# Patient Record
Sex: MALE | Race: BLACK OR AFRICAN AMERICAN | Employment: OTHER | URBAN - METROPOLITAN AREA
[De-identification: names, ages, dates, MRNs, and addresses within clinical notes are randomized per-mention and may not be internally consistent; named-entity substitution may affect disease eponyms.]

---

## 2017-03-30 PROBLEM — I10 HYPERTENSION: Status: ACTIVE | Noted: 2017-03-30

## 2017-04-19 ENCOUNTER — HOSPITAL ENCOUNTER (OUTPATIENT)
Dept: RADIATION THERAPY | Age: 72
Discharge: HOME | End: 2017-04-19
Payer: COMMERCIAL

## 2017-04-19 DIAGNOSIS — C61 MALIGNANT NEOPLASM OF PROSTATE (HCC): ICD-10-CM

## 2017-04-19 PROCEDURE — 99201 HC NEW PT LEVEL I: CPT

## 2017-04-20 ENCOUNTER — HOSPITAL ENCOUNTER (OUTPATIENT)
Dept: LAB | Age: 72
Discharge: HOME | End: 2017-04-20

## 2017-04-20 ENCOUNTER — HOSPITAL ENCOUNTER (OUTPATIENT)
Dept: LAB | Age: 72
Discharge: HOME | End: 2017-04-20
Payer: COMMERCIAL

## 2017-04-20 DIAGNOSIS — C61 MALIGNANT NEOPLASM OF PROSTATE (HCC): ICD-10-CM

## 2017-04-20 LAB — SENTARA SPECIMEN COL,SENBCF: NORMAL

## 2017-04-20 PROCEDURE — 93005 ELECTROCARDIOGRAM TRACING: CPT

## 2017-04-20 PROCEDURE — 99001 SPECIMEN HANDLING PT-LAB: CPT | Performed by: RADIOLOGY

## 2017-04-21 LAB
ATRIAL RATE: 83 BPM
CALCULATED P AXIS, ECG09: 79 DEGREES
CALCULATED R AXIS, ECG10: 77 DEGREES
CALCULATED T AXIS, ECG11: 63 DEGREES
DIAGNOSIS, 93000: NORMAL
P-R INTERVAL, ECG05: 172 MS
Q-T INTERVAL, ECG07: 388 MS
QRS DURATION, ECG06: 94 MS
QTC CALCULATION (BEZET), ECG08: 455 MS
VENTRICULAR RATE, ECG03: 83 BPM

## 2017-04-28 ENCOUNTER — HOSPITAL ENCOUNTER (OUTPATIENT)
Dept: RADIATION THERAPY | Age: 72
Discharge: HOME | End: 2017-04-28
Payer: COMMERCIAL

## 2017-04-28 PROCEDURE — 76873 ECHOGRAP TRANS R PROS STUDY: CPT

## 2017-05-03 DIAGNOSIS — C61 MALIGNANT NEOPLASM OF PROSTATE (HCC): ICD-10-CM

## 2017-05-10 ENCOUNTER — ANESTHESIA EVENT (OUTPATIENT)
Dept: RADIATION THERAPY | Age: 72
End: 2017-05-10
Payer: COMMERCIAL

## 2017-05-10 ENCOUNTER — HOSPITAL ENCOUNTER (OUTPATIENT)
Dept: LAB | Age: 72
Discharge: HOME | End: 2017-05-10

## 2017-05-10 LAB — SENTARA SPECIMEN COL,SENBCF: NORMAL

## 2017-05-10 PROCEDURE — 99001 SPECIMEN HANDLING PT-LAB: CPT | Performed by: RADIOLOGY

## 2017-05-11 ENCOUNTER — HOSPITAL ENCOUNTER (OUTPATIENT)
Dept: RADIATION THERAPY | Age: 72
Discharge: HOME | End: 2017-05-11
Admitting: UROLOGY
Payer: COMMERCIAL

## 2017-05-11 ENCOUNTER — ANESTHESIA (OUTPATIENT)
Dept: RADIATION THERAPY | Age: 72
End: 2017-05-11
Payer: COMMERCIAL

## 2017-05-11 VITALS
TEMPERATURE: 97.7 F | RESPIRATION RATE: 20 BRPM | HEART RATE: 67 BPM | BODY MASS INDEX: 28.45 KG/M2 | WEIGHT: 177 LBS | DIASTOLIC BLOOD PRESSURE: 80 MMHG | HEIGHT: 66 IN | SYSTOLIC BLOOD PRESSURE: 163 MMHG | OXYGEN SATURATION: 96 %

## 2017-05-11 PROBLEM — C61 MALIGNANT NEOPLASM OF PROSTATE (HCC): Status: ACTIVE | Noted: 2017-05-11

## 2017-05-11 PROCEDURE — 82947 ASSAY GLUCOSE BLOOD QUANT: CPT | Performed by: UROLOGY

## 2017-05-11 PROCEDURE — 74011250636 HC RX REV CODE- 250/636: Performed by: NURSE ANESTHETIST, CERTIFIED REGISTERED

## 2017-05-11 PROCEDURE — 77030012510 HC MSK AIRWY LMA TELE -B: Performed by: RADIOLOGY

## 2017-05-11 PROCEDURE — 74011000250 HC RX REV CODE- 250

## 2017-05-11 PROCEDURE — 77778 APPLY INTERSTIT RADIAT COMPL: CPT

## 2017-05-11 PROCEDURE — 74011250636 HC RX REV CODE- 250/636

## 2017-05-11 PROCEDURE — 77030036874 HC SPCR RECTAL HYDRGEL SPACEOAR AGMX -I

## 2017-05-11 PROCEDURE — 76060000034 HC ANESTHESIA 1.5 TO 2 HR

## 2017-05-11 PROCEDURE — 77295 3-D RADIOTHERAPY PLAN: CPT

## 2017-05-11 PROCEDURE — A4648 IMPLANTABLE TISSUE MARKER: HCPCS

## 2017-05-11 PROCEDURE — 77332 RADIATION TREATMENT AID(S): CPT

## 2017-05-11 PROCEDURE — 77030032490 HC SLV COMPR SCD KNE COVD -B

## 2017-05-11 PROCEDURE — 77030013079 HC BLNKT BAIR HGGR 3M -A: Performed by: RADIOLOGY

## 2017-05-11 PROCEDURE — 74011250637 HC RX REV CODE- 250/637: Performed by: NURSE ANESTHETIST, CERTIFIED REGISTERED

## 2017-05-11 PROCEDURE — 77030018846 HC SOL IRR STRL H20 ICUM -A

## 2017-05-11 PROCEDURE — 74011636320 HC RX REV CODE- 636/320: Performed by: RADIOLOGY

## 2017-05-11 PROCEDURE — 74011250636 HC RX REV CODE- 250/636: Performed by: RADIOLOGY

## 2017-05-11 PROCEDURE — 73290000070 HC RAD ONC TIME 1.5 TO 2 HR

## 2017-05-11 PROCEDURE — 77030034850

## 2017-05-11 PROCEDURE — C2636 BRACHY LINEAR, NON-STR,P-103: HCPCS

## 2017-05-11 PROCEDURE — 77030015736 HC BLLN ENDOCAV CIVC -B

## 2017-05-11 RX ORDER — OXYCODONE AND ACETAMINOPHEN 5; 325 MG/1; MG/1
1 TABLET ORAL
Qty: 12 TAB | Refills: 0 | Status: SHIPPED | OUTPATIENT
Start: 2017-05-11 | End: 2018-04-19

## 2017-05-11 RX ORDER — MIDAZOLAM HYDROCHLORIDE 1 MG/ML
INJECTION, SOLUTION INTRAMUSCULAR; INTRAVENOUS AS NEEDED
Status: DISCONTINUED | OUTPATIENT
Start: 2017-05-11 | End: 2017-05-11 | Stop reason: HOSPADM

## 2017-05-11 RX ORDER — SODIUM CHLORIDE, SODIUM LACTATE, POTASSIUM CHLORIDE, CALCIUM CHLORIDE 600; 310; 30; 20 MG/100ML; MG/100ML; MG/100ML; MG/100ML
50 INJECTION, SOLUTION INTRAVENOUS CONTINUOUS
Status: CANCELLED | OUTPATIENT
Start: 2017-05-11

## 2017-05-11 RX ORDER — HYDROMORPHONE HYDROCHLORIDE 2 MG/ML
0.5 INJECTION, SOLUTION INTRAMUSCULAR; INTRAVENOUS; SUBCUTANEOUS
Status: CANCELLED | OUTPATIENT
Start: 2017-05-11

## 2017-05-11 RX ORDER — PROPOFOL 10 MG/ML
INJECTION, EMULSION INTRAVENOUS AS NEEDED
Status: DISCONTINUED | OUTPATIENT
Start: 2017-05-11 | End: 2017-05-11 | Stop reason: HOSPADM

## 2017-05-11 RX ORDER — FAMOTIDINE 20 MG/1
20 TABLET, FILM COATED ORAL ONCE
Status: COMPLETED | OUTPATIENT
Start: 2017-05-11 | End: 2017-05-11

## 2017-05-11 RX ORDER — ONDANSETRON 2 MG/ML
4 INJECTION INTRAMUSCULAR; INTRAVENOUS
Status: CANCELLED | OUTPATIENT
Start: 2017-05-11

## 2017-05-11 RX ORDER — OXYCODONE AND ACETAMINOPHEN 5; 325 MG/1; MG/1
1 TABLET ORAL
Qty: 12 TAB | Refills: 0 | OUTPATIENT
Start: 2017-05-11 | End: 2017-05-11

## 2017-05-11 RX ORDER — HYDROCODONE BITARTRATE AND ACETAMINOPHEN 5; 325 MG/1; MG/1
1 TABLET ORAL AS NEEDED
Status: CANCELLED | OUTPATIENT
Start: 2017-05-11

## 2017-05-11 RX ORDER — DOCUSATE SODIUM 100 MG/1
100 CAPSULE, LIQUID FILLED ORAL 2 TIMES DAILY
Qty: 60 CAP | Refills: 0 | OUTPATIENT
Start: 2017-05-11 | End: 2017-05-11

## 2017-05-11 RX ORDER — CEFAZOLIN SODIUM 2 G/50ML
2 SOLUTION INTRAVENOUS ONCE
Status: COMPLETED | OUTPATIENT
Start: 2017-05-11 | End: 2017-05-11

## 2017-05-11 RX ORDER — DOCUSATE SODIUM 100 MG/1
100 CAPSULE, LIQUID FILLED ORAL 2 TIMES DAILY
Qty: 60 CAP | Refills: 0 | Status: SHIPPED | OUTPATIENT
Start: 2017-05-11 | End: 2017-06-10

## 2017-05-11 RX ORDER — SODIUM CHLORIDE, SODIUM LACTATE, POTASSIUM CHLORIDE, CALCIUM CHLORIDE 600; 310; 30; 20 MG/100ML; MG/100ML; MG/100ML; MG/100ML
75 INJECTION, SOLUTION INTRAVENOUS CONTINUOUS
Status: DISPENSED | OUTPATIENT
Start: 2017-05-12 | End: 2017-05-12

## 2017-05-11 RX ORDER — FENTANYL CITRATE 50 UG/ML
25 INJECTION, SOLUTION INTRAMUSCULAR; INTRAVENOUS AS NEEDED
Status: CANCELLED | OUTPATIENT
Start: 2017-05-11

## 2017-05-11 RX ORDER — SODIUM CHLORIDE 0.9 % (FLUSH) 0.9 %
5-10 SYRINGE (ML) INJECTION ONCE
Status: COMPLETED | OUTPATIENT
Start: 2017-05-11 | End: 2017-05-11

## 2017-05-11 RX ORDER — LIDOCAINE HYDROCHLORIDE 20 MG/ML
INJECTION, SOLUTION EPIDURAL; INFILTRATION; INTRACAUDAL; PERINEURAL AS NEEDED
Status: DISCONTINUED | OUTPATIENT
Start: 2017-05-11 | End: 2017-05-11 | Stop reason: HOSPADM

## 2017-05-11 RX ORDER — ONDANSETRON 2 MG/ML
INJECTION INTRAMUSCULAR; INTRAVENOUS AS NEEDED
Status: DISCONTINUED | OUTPATIENT
Start: 2017-05-11 | End: 2017-05-11 | Stop reason: HOSPADM

## 2017-05-11 RX ADMIN — ONDANSETRON 4 MG: 2 INJECTION INTRAMUSCULAR; INTRAVENOUS at 10:33

## 2017-05-11 RX ADMIN — Medication 10 ML: at 09:30

## 2017-05-11 RX ADMIN — CEFAZOLIN SODIUM 2 G: 2 SOLUTION INTRAVENOUS at 10:19

## 2017-05-11 RX ADMIN — PROPOFOL 150 MG: 10 INJECTION, EMULSION INTRAVENOUS at 10:23

## 2017-05-11 RX ADMIN — MIDAZOLAM HYDROCHLORIDE 2 MG: 1 INJECTION, SOLUTION INTRAMUSCULAR; INTRAVENOUS at 10:19

## 2017-05-11 RX ADMIN — LIDOCAINE HYDROCHLORIDE 100 MG: 20 INJECTION, SOLUTION EPIDURAL; INFILTRATION; INTRACAUDAL; PERINEURAL at 10:23

## 2017-05-11 RX ADMIN — SODIUM CHLORIDE, SODIUM LACTATE, POTASSIUM CHLORIDE, AND CALCIUM CHLORIDE: 600; 310; 30; 20 INJECTION, SOLUTION INTRAVENOUS at 10:19

## 2017-05-11 RX ADMIN — FAMOTIDINE 20 MG: 20 TABLET, FILM COATED ORAL at 09:33

## 2017-05-11 RX ADMIN — IOVERSOL 0.5 ML: 678 INJECTION INTRA-ARTERIAL; INTRAVENOUS at 09:34

## 2017-05-11 NOTE — PROGRESS NOTES
PROCEDURE NOTE    Pt arrived to procedure room at 1019, pt placed supine, monitors placed, bonilla inserted with 0.5ml Optiray & 9.5ml NS in balloon, Drain & then clamped w/ 150ml Sterile water instilled in bladder.      Body aligned SCDs placed OLEKSANDR LE, Pt arms on armboard with eggcrate for pressure support, head remains aligned Right & Left Leg placed in Yellow Fin Stirrups, eggcrates & gelpads for pressure points    Time-out performed: 1042    Procedure Start: Michelle [place] 94 Implanted: 1115    Final Seed Implanted: 1130    Procedure stop: 1150    Exit room:

## 2017-05-11 NOTE — ANESTHESIA POSTPROCEDURE EVALUATION
Post-Anesthesia Evaluation & Assessment    Visit Vitals    BP (P) 165/83 (BP 1 Location: Left arm, BP Patient Position: Head of bed elevated (Comment degrees))    Pulse 80    Temp 36.6 °C (97.9 °F)    Resp 15    Ht 5' 6\" (1.676 m)    Wt 80.3 kg (177 lb)    SpO2 96%    BMI 28.57 kg/m2       Nausea/Vomiting: no nausea    Post-operative hydration adequate.     Pain score (VAS): 0    Mental status & Level of consciousness: alert and oriented x 3    Neurological status: moves all extremities, sensation grossly intact    Pulmonary status: airway patent, no supplemental oxygen required    Complications related to anesthesia: none    Additional comments:

## 2017-05-11 NOTE — LETTER
5/17/2017 2:58 PM 
 
Mr. Ericka Vaughn [address] [facility] [address] [id number] Dear Ericka Vaughn: 
 
Please find your most recent results below. Resulted Orders POC 6 PLUS Result Value Ref Range Sodium (POC) 142 136 - 145 MMOL/L Potassium (POC) 3.1 (L) 3.5 - 5.5 MMOL/L Chloride (POC) 101 100 - 108 MMOL/L  
 BUN (POC) 11 7 - 18 MG/DL Glucose (POC) 119 (H) 74 - 106 MG/DL Hematocrit (POC) 40 36 - 49 % Hemoglobin (POC) 13.6 12 - 16 G/DL  
 
 
RECOMMENDATIONS: 
Noted Please call me if you have any questions: [phone] Sincerely, 
 
 
Bam Blackwell MD/cayden

## 2017-05-11 NOTE — IP AVS SNAPSHOT
Poe Screen 
 
 
 [address] [address] 
[phone] Patient: Charissa Hammans MRN: [id number] RTV:[id number] You are allergic to the following No active allergies Recent Documentation Height Weight BMI Smoking Status 1.676 m 80.3 kg 28.57 kg/m2 Current Every Day Smoker Emergency Contacts  (Rel.) Home Phone Work Phone Mobile Phone Charlene Grayson (Spouse) [phone] -- -- About your hospitalization You were admitted on:  May 11, 2017 You last received care in the:  [facility] RADIATION THERAPY You were discharged on:  May 11, 2017 Why you were hospitalized Your primary diagnosis was:  Not on File Your Primary Care Physician (PCP) Primary Care Physician Office Phone Office Fax Jordy Killian [phone] Follow-up Information Follow up With Details Comments Contact Info Elinor Kurtz MD   [address] [address] 
[phone] Appointments for Next 14 days 5/25/2017  2:30 PM  NURSE VISIT [facility] Current Discharge Medication List  
  
START taking these medications Dose & Instructions Dispensing Information Comments Morning Noon Evening Bedtime  
 docusate sodium 100 mg capsule Commonly known as:  Prince Maria Isabel Your last dose was: Your next dose is:    
   
   
 Dose:  100 mg Take 1 Cap by mouth two (2) times a day for 30 days. Quantity:  60 Cap Refills:  0  
     
   
   
   
  
 oxyCODONE-acetaminophen 5-325 mg per tablet Commonly known as:  PERCOCET Your last dose was: Your next dose is:    
   
   
 Dose:  1 Tab Take 1 Tab by mouth every four (4) hours as needed for Pain. Max Daily Amount: 6 Tabs. Quantity:  12 Tab Refills:  0 CONTINUE these medications which have NOT CHANGED Dose & Instructions Dispensing Information Comments Morning Noon Evening Bedtime  
 amLODIPine 5 mg tablet Commonly known as:  Sherald Burkitt Your last dose was: Your next dose is:    
   
   
  Refills:  0  
     
   
   
   
  
 aspirin 81 mg chewable tablet Your last dose was: Your next dose is:    
   
   
 Dose:  81 mg Take 81 mg by mouth daily. Refills:  0  
     
   
   
   
  
 diclofenac 0.1 % ophthalmic solution Commonly known as:  VOLTAREN Your last dose was: Your next dose is:    
   
   
  Refills:  0  
     
   
   
   
  
 dorzolamide-timolol 22.3-6.8 mg/mL ophthalmic solution Commonly known as:  COSOPT Your last dose was: Your next dose is:    
   
   
  Refills:  1  
     
   
   
   
  
 hydroCHLOROthiazide 25 mg tablet Commonly known as:  HYDRODIURIL Your last dose was: Your next dose is:    
   
   
  Refills:  0  
     
   
   
   
  
 potassium chloride SA 10 mEq capsule Commonly known as:  Komal Zapata Your last dose was: Your next dose is:    
   
   
  Refills:  0  
     
   
   
   
  
 pravastatin 20 mg tablet Commonly known as:  PRAVACHOL Your last dose was: Your next dose is:    
   
   
  Refills:  0  
     
   
   
   
  
 TRAVATAN Z 0.004 % ophthalmic solution Generic drug:  travoprost  
   
Your last dose was: Your next dose is:    
   
   
  Refills:  0 Where to Get Your Medications Information on where to get these meds will be given to you by the nurse or doctor. ! Ask your nurse or doctor about these medications  
  docusate sodium 100 mg capsule  
 oxyCODONE-acetaminophen 5-325 mg per tablet Discharge Instructions Post-Operative Seed Implant Instructions Many radioactive metallic seeds were implanted into your prostate. The seeds are similar to a grain of rice in size and shape.  Though the seeds are designed to remain in place, they may be expelled through the urine. This should not alarm you; however, it is important that you do not  the radioactive seed with your fingers. Please use a spoon or a pair of tweezers to place the radioactive seed in the toilet and then flush During the first few days you may have some bruising on the perineum (the place between your anus and your scrotum) or on the scrotum itself. This is caused by the needles (usually 20-30) which are used to place the seeds. This will resolve on its own and usually does not cause any discomfort. For about a week after treatment, you may have some pain or swelling in the area between your scrotum and rectum, and your urine may be reddish-brown. Rarely a larger hematoma may form on the perineum and this will cause some discomfort with sitting. This should be brought to the doctors attention, but it will resolve on its own. You can alternate between an ice pack and heat pack for 10 minutes interval to assist with the reduction of inflammation and pain to perineum. Side Effects Immediately post procedure: blood in the urine, bruising/tenderness/discoloration at the implant site, and/or swelling at the implant site. These symptoms should subside after a few days. Some patients will have trouble passing urine after the catheter is removed due to swelling in the prostate. You should call Dr. Jeyson Salazar or go to Emergency Room if you cannot pass urine within 6 hours of catheter removal or any time if you are having trouble passing your urine. Some patients may require a catheter for 1 week or more after seed implantation. Other: The following side effects are most likely to occur over the first two months following the procedure: Frequency and/or urgency with urination, burning with urination, a weaker urine stream, as well as frequency and /or urgency of bowel movements.  After the initial two months, you should notice a steady decline in these symptoms. Your symptoms should subside completely by the end of six month to a year. Precautions ? Abstain from sexual activity for two weeks ? After the initial two weeks post procedure, you will be required to use a condom for the next 60 days anytime you have intercourse ? Do not let children under  eight years of age sit on your lap for the first sixty days ? Do not let pregnant women sit on your lap for the first sixty days. Diet:   
Regular, unless you are on a special diet for other reasons. Activity: 
  
Avoid heavy lifting or strenuous physical activity for the first two days after the procedure. At that time you may return to your normal activity level if the urine is clear and you feel fine. If the urine is still bloody you should rest and drink plenty of fluids until it is clear, and then you may resume normal activity. Avoid heavy lifting for one month. Avoid sitting on a hard seat (such as a bicycle) for 2 months. Avoid long periods of sitting, such as in a car or on an airplane without taking leg stretching 
breaks. Depending on the demands of your job, you may return to work any time during the week after the procedure, noting the precaution about prolonged sitting. You may return to a regular diet as tolerated following the procedure. Do not drink excessive amounts of fluids, as they can make side effects worse. You will experience a decrease in ejaculate following the procedures. This is normal, as the prostate gland is responsible for generating over 80% of the fluid disseminated during 
ejaculation. You will most likely experience a reddish color in your ejaculate for a few weeks following the procedure. This is normal and will improve as time 
passes, if it persists, see your doctor. Follow up Your follow up imaging will be at [facility] on ___________at ___________ am/pm. 
 
Please call us if you have any questions: [phone] Radiation Therapy DISCHARGE SUMMARY from Nurse The following personal items are in your possession at time of discharge: 
 
  
Visual Aid: Glasses PATIENT INSTRUCTIONS: 
 
 
F-face looks uneven A-arms unable to move or move unevenly S-speech slurred or non-existent T-time-call 911 as soon as signs and symptoms begin-DO NOT go Back to bed or wait to see if you get better-TIME IS BRAIN. Warning Signs of HEART ATTACK Call 911 if you have these symptoms: 
? Chest discomfort. Most heart attacks involve discomfort in the center of the chest that lasts more than a few minutes, or that goes away and comes back. It can feel like uncomfortable pressure, squeezing, fullness, or pain. ? Discomfort in other areas of the upper body. Symptoms can include pain or discomfort in one or both arms, the back, neck, jaw, or stomach. ? Shortness of breath with or without chest discomfort. ? Other signs may include breaking out in a cold sweat, nausea, or lightheadedness. Don't wait more than five minutes to call [address]! Fast action can save your life. Calling 911 is almost always the fastest way to get lifesaving treatment. Emergency Medical Services staff can begin treatment when they arrive  up to an hour sooner than if someone gets to the hospital by car. The discharge information has been reviewed with the patient and grand daughter. The patient and grand daughter verbalized understanding.  
 
Discharge medications reviewed with the patient and grand daughter and appropriate educational materials and side effects teaching were provided. Discharge Instructions Attachments/References CIPROFLOXACIN (BY MOUTH) (ENGLISH) IBUPROFEN (BY MOUTH) (ENGLISH) URINARY RETENTION (ENGLISH) NARCOTIC-ANALGESIC/ACETAMINOPHEN (BY MOUTH) (ENGLISH) LAXATIVE, STOOL SOFTENERS (BY MOUTH) (ENGLISH) Discharge Orders None Introducing [facility]! Peg Hemp introduces ideasoft patient portal. Now you can access parts of your medical record, email your doctor's office, and request medication refills online. 1. In your internet browser, go to [email or web address] 2. Click on the First Time User? Click Here link in the Sign In box. You will see the New Member Sign Up page. 3. Enter your ideasoft Access Code exactly as it appears below. You will not need to use this code after youve completed the sign-up process. If you do not sign up before the expiration date, you must request a new code. · ideasoft Access Code: [email or web address] Expires: 5/25/2017 11:06 AM 
 
4. Enter the last four digits of your Social Security Number (xxxx) and Date of Birth (mm/dd/yyyy) as indicated and click Submit. You will be taken to the next sign-up page. 5. Create a ideasoft ID. This will be your ideasoft login ID and cannot be changed, so think of one that is secure and easy to remember. 6. Create a ideasoft password. You can change your password at any time. 7. Enter your Password Reset Question and Answer. This can be used at a later time if you forget your password. 8. Enter your e-mail address. You will receive e-mail notification when new information is available in [address] 9. Click Sign Up. You can now view and download portions of your medical record. 10. Click the Download Summary menu link to download a portable copy of your medical information.  
 
If you have questions, please visit the Frequently Asked Questions section of the [employer]. Remember, MyChart is NOT to be used for urgent needs. For medical emergencies, dial 911. Now available from your iPhone and Android! General Information Please provide this summary of care documentation to your next provider. Patient Signature:  ____________________________________________________________ Date:  ____________________________________________________________  
  
Krystyna Lightning Provider Signature:  ____________________________________________________________ Date:  ____________________________________________________________

## 2017-05-11 NOTE — DISCHARGE INSTRUCTIONS
Post-Operative Seed Implant Instructions    Many radioactive metallic seeds were implanted into your prostate. The seeds are similar to a grain of rice in size and shape. Though the seeds are designed to remain in place, they may be expelled through the urine. This should not alarm you; however, it is important that you do not  the radioactive seed with your fingers. Please use a spoon or a pair of tweezers to place the radioactive seed in the toilet and then flush    During the first few days you may have some bruising on the perineum (the place between your anus and your scrotum) or on the scrotum itself. This is caused by the needles (usually 20-30) which are used to place the seeds. This will resolve on its own and usually does not cause any discomfort. For about a week after treatment, you may have some pain or swelling in the area between your scrotum and rectum, and your urine may be reddish-brown. Rarely a larger hematoma may form on the perineum and this will cause some discomfort with sitting. This should be brought to the doctors attention, but it will resolve on its own. You can alternate between an ice pack and heat pack for 10 minutes interval to assist with the reduction of inflammation and pain to perineum. Side Effects    Immediately post procedure: blood in the urine, bruising/tenderness/discoloration at the implant site, and/or swelling at the implant site. These symptoms should subside after a few days. Some patients will have trouble passing urine after the catheter is removed due to swelling in the prostate. You should call Dr. Ana Coles or go to Emergency Room if you cannot pass urine within 6 hours of catheter removal or any time if you are having trouble passing your urine. Some patients may require a catheter for 1 week or more after seed implantation. Other:   The following side effects are most likely to occur over the first two months following the procedure: Frequency and/or urgency with urination, burning with urination, a weaker urine stream, as well as frequency and /or urgency of bowel movements. After the initial two months, you should notice a steady decline in these symptoms. Your symptoms should subside completely by the end of six month to a year. Precautions     Abstain from sexual activity for two weeks   After the initial two weeks post procedure, you will be required to use a condom for the next 60 days anytime you have intercourse   Do not let children under  eight years of age sit on your lap for the first sixty days   Do not let pregnant women sit on your lap for the first sixty days. Diet:    Regular, unless you are on a special diet for other reasons. Activity:     Avoid heavy lifting or strenuous physical activity for the first two days after the procedure. At that time you may return to your normal activity level if the urine is clear and you feel fine. If the urine is still bloody you should rest and drink plenty of fluids until it is clear, and then you may resume normal activity. Avoid heavy lifting for one month. Avoid sitting on a hard seat (such as a bicycle) for 2 months. Avoid long periods of sitting, such as in a car or on an airplane without taking leg stretching  breaks. Depending on the demands of your job, you may return to work any time during the week after the procedure, noting the precaution about prolonged sitting. You may return to a regular diet as tolerated following the procedure. Do not drink excessive amounts of fluids, as they can make side effects worse. You will experience a decrease in ejaculate following the procedures. This is normal, as the prostate gland is responsible for generating over 80% of the fluid disseminated during  ejaculation. You will most likely experience a reddish color in your ejaculate for a few weeks following the procedure.  This is normal and will improve as time  passes, if it persists, see your doctor. Follow up     Your follow up imaging will be at [facility] at [facility]  on ___________at ___________ am/pm.    Please call us if you have any questions: [phone]    Radiation Therapy       DISCHARGE SUMMARY from Nurse    The following personal items are in your possession at time of discharge:       Visual Aid: Glasses                      PATIENT INSTRUCTIONS:    After general anesthesia or intravenous sedation, for 24 hours or while taking prescription Narcotics:  · Limit your activities  · Do not drive and operate hazardous machinery  · Do not make important personal or business decisions  · Do  not drink alcoholic beverages  · If you have not urinated within 8 hours after discharge, please contact your surgeon on call. Report the following to your surgeon:  · Excessive pain, swelling, redness or odor of or around the surgical area  · Temperature over 100.5  · Nausea and vomiting lasting longer than 4 hours or if unable to take medications  · Any signs of decreased circulation or nerve impairment to extremity: change in color, persistent  numbness, tingling, coldness or increase pain  · Any questions      What to do at Home:  Recommended activity: Activity as tolerated and no driving for today,     If you experience any of the following symptoms please follow up with Emergency Department. *  Please give a list of your current medications to your Primary Care Provider. *  Please update this list whenever your medications are discontinued, doses are      changed, or new medications (including over-the-counter products) are added. *  Please carry medication information at all times in case of emergency situations.           These are general instructions for a healthy lifestyle:    No smoking/ No tobacco products/ Avoid exposure to second hand smoke    Surgeon General's Warning:  Quitting smoking now greatly reduces serious risk to your health. Obesity, smoking, and sedentary lifestyle greatly increases your risk for illness    A healthy diet, regular physical exercise & weight monitoring are important for maintaining a healthy lifestyle    You may be retaining fluid if you have a history of heart failure or if you experience any of the following symptoms:  Weight gain of 3 pounds or more overnight or 5 pounds in a week, increased swelling in our hands or feet or shortness of breath while lying flat in bed. Please call your doctor as soon as you notice any of these symptoms; do not wait until your next office visit. Recognize signs and symptoms of STROKE:    F-face looks uneven    A-arms unable to move or move unevenly    S-speech slurred or non-existent    T-time-call 911 as soon as signs and symptoms begin-DO NOT go       Back to bed or wait to see if you get better-TIME IS BRAIN. Warning Signs of HEART ATTACK     Call 911 if you have these symptoms:   Chest discomfort. Most heart attacks involve discomfort in the center of the chest that lasts more than a few minutes, or that goes away and comes back. It can feel like uncomfortable pressure, squeezing, fullness, or pain.  Discomfort in other areas of the upper body. Symptoms can include pain or discomfort in one or both arms, the back, neck, jaw, or stomach.  Shortness of breath with or without chest discomfort.  Other signs may include breaking out in a cold sweat, nausea, or lightheadedness. Don't wait more than five minutes to call 911 - MINUTES MATTER! Fast action can save your life. Calling 911 is almost always the fastest way to get lifesaving treatment. Emergency Medical Services staff can begin treatment when they arrive -- up to an hour sooner than if someone gets to the hospital by car. The discharge information has been reviewed with the patient and grand daughter. The patient and grand daughter verbalized understanding.     Discharge medications reviewed with the patient and grand daughter and appropriate educational materials and side effects teaching were provided.

## 2017-05-11 NOTE — PROGRESS NOTES
4517: Patient arrived for procedure. A&Ox4. Per patients report bowel prep complete. NPO since midnight. Reviewed Allergies and PTA medications. Consent verified and in chart. Denies pain or any other discomfort.

## 2017-05-11 NOTE — ANESTHESIA PREPROCEDURE EVALUATION
Anesthetic History   No history of anesthetic complications            Review of Systems / Medical History  Patient summary reviewed, nursing notes reviewed and pertinent labs reviewed    Pulmonary  Within defined limits                 Neuro/Psych   Within defined limits           Cardiovascular    Hypertension: well controlled              Exercise tolerance: >4 METS     GI/Hepatic/Renal           Liver disease     Endo/Other        Arthritis     Other Findings              Physical Exam    Airway  Mallampati: III  TM Distance: 4 - 6 cm  Neck ROM: normal range of motion   Mouth opening: Normal     Cardiovascular  Regular rate and rhythm,  S1 and S2 normal,  no murmur, click, rub, or gallop  Rhythm: regular  Rate: normal         Dental  No notable dental hx       Pulmonary  Breath sounds clear to auscultation               Abdominal  GI exam deferred       Other Findings            Anesthetic Plan    ASA: 3  Anesthesia type: general          Induction: Intravenous  Anesthetic plan and risks discussed with: Patient

## 2017-05-11 NOTE — PROGRESS NOTES
POST PROCEDURE NOTE    Pt arrived to post procedure area B at 1156, pt in supine with head of bed elevated, monitors placed, bonilla catheter discontinued in treatment room 100ml of dark anna colored urine. Patient voided 200ml of anna colored urine. Bladder scan performed with the patient having 81ml of residual urine in the bladder.      Patient discharged from procedure area B: 1305

## 2017-05-11 NOTE — BRIEF OP NOTE
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 Surgeon:  Vahid Patrick MD  Radiation oncologist:  Dr. Mitzi Mcdonough MD  Anesthesia: General  Estimated Blood Loss: less than 20 cc. Specimens: * No specimens in log *   Findings: Prostate volume of 47.5 cc, Total 19 needles placed to deposit 55 seeds for total energy of 165 units. V100 of 95.82% although is higher as prostate images shifted during the procedure.    Complications: none  Implants: none    Vahid Patrick MD

## 2017-05-12 NOTE — OP NOTES
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 Surgeon: Cheri Spain MD  Radiation oncologist: Dr. Terence Smith MD    Anesthesia: General    Estimated Blood Loss: less than 20 cc. Specimens: none    Findings: Prostate volume of 47.5 cc, Total 19 needles placed to deposit 55 seeds for total energy of 165 units. V100 of 95.82% although is higher as prostate images shifted during the procedure. Complications: none    Implants:  radioactive seed sources. INDICATIONS: This is a 71year-old male, who was noted to have an elevated  PSA to 15.76.  He was found to have the above cancer and has decided on combination  brachytherapy as his definitve therapy for his prostate cancer.  He has been completely advised of potential risks and complications of the procedure. Risks and benefits, as well as alternatives, have been discussed with the patient and he agrees to proceed.      DESCRIPTION OF PROCEDURE: The patient was identified in the holding area,  given informed consent again, and then was taken to the cystoscopy suite. On the cystoscopy table, the patient was placed in the supine position and  underwent adequate general anesthetic and then was placed in dorsal  lithotomy position, appropriately padded, prepped and draped in the usual  sterile fashion for transperineal procedure. The patient had a time-out  taken, all were in agreement on the procedure. SCDs were placed for DVT  prophylaxis. Appropriate parenteral prophylactic antibiotics were given. The patient had burn precautions, beta blocker concerns addressed.  The  patient then underwent further prep with the scrotum placed cephalad, Smith  catheter with contrast in the balloon was placed to identify the bladder  neck. The patient then underwent transrectal ultrasonography. Perineum was  prepped and then real-time volumetrics and contouring was  performed. This was correlated with the pre-planned program from Dr. Judith Beltran with the correlation of these performed. The seed implant  dosimetry was superimposed on real-time imaging and then we were able to  proceed with placement of the needles. The needles were placed in the usual  fashion, and after needle placement, we confirmed adequate coverage of the  procedure, with greater than 99% V100. At this point, we then deposited the  seeds in the longitudinal section of the ultrasound. With retraction points  addressed, we were able to place the seeds appropriately per pre-planned  dosimetry. After the seeds were placed from the left to right, superior  to posterior approach, with all needles removed and seeds placed, we then  performed real-time dosimetry once again and found 2 additional seed  sources were necessary to obtain a V100 of 96%. Dr. Judith Beltran inserted the Fiducial marker seed implants and Space-OAR per his separate dictation. At this point, the procedure was terminated, Smith catheter removed, perineum was cleansed with saline, and bacitracin ointment was applied. The patient was carefully  taken out of the dorsal lithotomy position, and 2D fluoroscopy confirmed adequate positioning of the seeds, and the patient was taken to the recovery room in stable condition.       Florina Gomes MD

## 2017-05-16 LAB
BUN BLD-MCNC: 11 MG/DL (ref 7–18)
CHLORIDE BLD-SCNC: 101 MMOL/L (ref 100–108)
GLUCOSE BLD STRIP.AUTO-MCNC: 119 MG/DL (ref 74–106)
HCT VFR BLD CALC: 40 % (ref 36–49)
HGB BLD-MCNC: 13.6 G/DL (ref 12–16)
POTASSIUM BLD-SCNC: 3.1 MMOL/L (ref 3.5–5.5)
SODIUM BLD-SCNC: 142 MMOL/L (ref 136–145)

## 2017-06-08 ENCOUNTER — APPOINTMENT (OUTPATIENT)
Dept: MRI IMAGING | Age: 72
End: 2017-06-08
Attending: RADIOLOGY
Payer: COMMERCIAL

## 2017-06-08 ENCOUNTER — APPOINTMENT (OUTPATIENT)
Dept: RADIATION THERAPY | Age: 72
End: 2017-06-08
Attending: RADIOLOGY
Payer: COMMERCIAL

## 2017-06-09 ENCOUNTER — APPOINTMENT (OUTPATIENT)
Dept: RADIATION THERAPY | Age: 72
End: 2017-06-09
Attending: RADIOLOGY
Payer: COMMERCIAL

## 2017-06-12 ENCOUNTER — APPOINTMENT (OUTPATIENT)
Dept: RADIATION THERAPY | Age: 72
End: 2017-06-12
Attending: RADIOLOGY
Payer: COMMERCIAL

## 2017-06-16 ENCOUNTER — APPOINTMENT (OUTPATIENT)
Dept: RADIATION THERAPY | Age: 72
End: 2017-06-16
Attending: RADIOLOGY
Payer: COMMERCIAL

## 2017-06-19 ENCOUNTER — APPOINTMENT (OUTPATIENT)
Dept: RADIATION THERAPY | Age: 72
End: 2017-06-19
Attending: RADIOLOGY
Payer: COMMERCIAL

## 2017-06-20 ENCOUNTER — HOSPITAL ENCOUNTER (OUTPATIENT)
Dept: MRI IMAGING | Age: 72
Discharge: HOME | End: 2017-06-20
Attending: RADIOLOGY
Payer: COMMERCIAL

## 2017-06-20 ENCOUNTER — HOSPITAL ENCOUNTER (OUTPATIENT)
Dept: RADIATION THERAPY | Age: 72
Discharge: HOME | End: 2017-06-20
Attending: RADIOLOGY
Payer: COMMERCIAL

## 2017-06-20 DIAGNOSIS — C61 PROSTATE CANCER (HCC): ICD-10-CM

## 2017-06-20 LAB — CREAT UR-MCNC: 1 MG/DL (ref 0.6–1.3)

## 2017-06-20 PROCEDURE — 77334 RADIATION TREATMENT AID(S): CPT

## 2017-06-20 PROCEDURE — 82565 ASSAY OF CREATININE: CPT

## 2017-06-21 ENCOUNTER — HOSPITAL ENCOUNTER (OUTPATIENT)
Dept: RADIATION THERAPY | Age: 72
Discharge: HOME | End: 2017-06-21
Attending: RADIOLOGY
Payer: COMMERCIAL

## 2017-06-22 ENCOUNTER — HOSPITAL ENCOUNTER (OUTPATIENT)
Dept: RADIATION THERAPY | Age: 72
Discharge: HOME | End: 2017-06-22
Attending: RADIOLOGY
Payer: COMMERCIAL

## 2017-06-22 PROCEDURE — 77301 RADIOTHERAPY DOSE PLAN IMRT: CPT

## 2017-06-22 PROCEDURE — 77338 DESIGN MLC DEVICE FOR IMRT: CPT

## 2017-06-22 PROCEDURE — 77300 RADIATION THERAPY DOSE PLAN: CPT

## 2017-06-23 ENCOUNTER — HOSPITAL ENCOUNTER (OUTPATIENT)
Dept: RADIATION THERAPY | Age: 72
Discharge: HOME | End: 2017-06-23
Attending: RADIOLOGY
Payer: COMMERCIAL

## 2017-06-23 PROCEDURE — 77318 BRACHYTX ISODOSE COMPLEX: CPT

## 2017-06-23 PROCEDURE — 77370 RADIATION PHYSICS CONSULT: CPT

## 2017-06-27 ENCOUNTER — HOSPITAL ENCOUNTER (OUTPATIENT)
Dept: RADIATION THERAPY | Age: 72
Discharge: HOME | End: 2017-06-27
Attending: RADIOLOGY
Payer: COMMERCIAL

## 2017-06-27 PROCEDURE — 77385 HC IMRT TRMT DLVR SMPL: CPT

## 2017-06-28 ENCOUNTER — HOSPITAL ENCOUNTER (OUTPATIENT)
Dept: RADIATION THERAPY | Age: 72
Discharge: HOME | End: 2017-06-28
Attending: RADIOLOGY
Payer: COMMERCIAL

## 2017-06-28 PROCEDURE — 77385 HC IMRT TRMT DLVR SMPL: CPT

## 2017-06-29 ENCOUNTER — HOSPITAL ENCOUNTER (OUTPATIENT)
Dept: RADIATION THERAPY | Age: 72
Discharge: HOME | End: 2017-06-29
Attending: RADIOLOGY
Payer: COMMERCIAL

## 2017-06-29 PROCEDURE — 77385 HC IMRT TRMT DLVR SMPL: CPT

## 2017-06-30 ENCOUNTER — HOSPITAL ENCOUNTER (OUTPATIENT)
Dept: RADIATION THERAPY | Age: 72
Discharge: HOME | End: 2017-06-30
Attending: RADIOLOGY
Payer: COMMERCIAL

## 2017-06-30 PROCEDURE — 77385 HC IMRT TRMT DLVR SMPL: CPT

## 2017-07-03 ENCOUNTER — HOSPITAL ENCOUNTER (OUTPATIENT)
Dept: RADIATION THERAPY | Age: 72
Discharge: HOME | End: 2017-07-03
Attending: RADIOLOGY
Payer: MEDICARE

## 2017-07-03 PROCEDURE — 77385 HC IMRT TRMT DLVR SMPL: CPT

## 2017-07-03 PROCEDURE — 77336 RADIATION PHYSICS CONSULT: CPT

## 2017-07-05 ENCOUNTER — HOSPITAL ENCOUNTER (OUTPATIENT)
Dept: RADIATION THERAPY | Age: 72
Discharge: HOME | End: 2017-07-05
Attending: RADIOLOGY
Payer: MEDICARE

## 2017-07-05 PROCEDURE — 77385 HC IMRT TRMT DLVR SMPL: CPT

## 2017-07-06 ENCOUNTER — HOSPITAL ENCOUNTER (OUTPATIENT)
Dept: RADIATION THERAPY | Age: 72
Discharge: HOME | End: 2017-07-06
Attending: RADIOLOGY
Payer: MEDICARE

## 2017-07-06 PROCEDURE — 77385 HC IMRT TRMT DLVR SMPL: CPT

## 2017-07-07 ENCOUNTER — HOSPITAL ENCOUNTER (OUTPATIENT)
Dept: RADIATION THERAPY | Age: 72
Discharge: HOME | End: 2017-07-07
Attending: RADIOLOGY
Payer: MEDICARE

## 2017-07-07 PROCEDURE — 77385 HC IMRT TRMT DLVR SMPL: CPT

## 2017-07-10 ENCOUNTER — HOSPITAL ENCOUNTER (OUTPATIENT)
Dept: RADIATION THERAPY | Age: 72
Discharge: HOME | End: 2017-07-10
Attending: RADIOLOGY
Payer: MEDICARE

## 2017-07-10 PROCEDURE — 77385 HC IMRT TRMT DLVR SMPL: CPT

## 2017-07-11 ENCOUNTER — HOSPITAL ENCOUNTER (OUTPATIENT)
Dept: RADIATION THERAPY | Age: 72
Discharge: HOME | End: 2017-07-11
Attending: RADIOLOGY
Payer: MEDICARE

## 2017-07-11 PROCEDURE — 77385 HC IMRT TRMT DLVR SMPL: CPT

## 2017-07-11 PROCEDURE — 77336 RADIATION PHYSICS CONSULT: CPT

## 2017-07-12 ENCOUNTER — HOSPITAL ENCOUNTER (OUTPATIENT)
Dept: RADIATION THERAPY | Age: 72
Discharge: HOME | End: 2017-07-12
Attending: RADIOLOGY
Payer: MEDICARE

## 2017-07-12 PROCEDURE — 77385 HC IMRT TRMT DLVR SMPL: CPT

## 2017-07-13 ENCOUNTER — HOSPITAL ENCOUNTER (OUTPATIENT)
Dept: RADIATION THERAPY | Age: 72
Discharge: HOME | End: 2017-07-13
Attending: RADIOLOGY
Payer: MEDICARE

## 2017-07-13 PROCEDURE — 77385 HC IMRT TRMT DLVR SMPL: CPT

## 2017-07-14 ENCOUNTER — HOSPITAL ENCOUNTER (OUTPATIENT)
Dept: RADIATION THERAPY | Age: 72
Discharge: HOME | End: 2017-07-14
Attending: RADIOLOGY
Payer: MEDICARE

## 2017-07-14 PROCEDURE — 77385 HC IMRT TRMT DLVR SMPL: CPT

## 2017-07-17 ENCOUNTER — HOSPITAL ENCOUNTER (OUTPATIENT)
Dept: RADIATION THERAPY | Age: 72
Discharge: HOME | End: 2017-07-17
Attending: RADIOLOGY
Payer: MEDICARE

## 2017-07-17 PROCEDURE — 77385 HC IMRT TRMT DLVR SMPL: CPT

## 2017-07-18 ENCOUNTER — HOSPITAL ENCOUNTER (OUTPATIENT)
Dept: RADIATION THERAPY | Age: 72
Discharge: HOME | End: 2017-07-18
Payer: MEDICARE

## 2017-07-18 PROCEDURE — 77336 RADIATION PHYSICS CONSULT: CPT

## 2017-07-18 PROCEDURE — 77385 HC IMRT TRMT DLVR SMPL: CPT

## 2017-07-19 ENCOUNTER — HOSPITAL ENCOUNTER (OUTPATIENT)
Dept: RADIATION THERAPY | Age: 72
Discharge: HOME | End: 2017-07-19
Payer: MEDICARE

## 2017-07-19 PROCEDURE — 77385 HC IMRT TRMT DLVR SMPL: CPT

## 2017-07-20 ENCOUNTER — HOSPITAL ENCOUNTER (OUTPATIENT)
Dept: RADIATION THERAPY | Age: 72
Discharge: HOME | End: 2017-07-20
Payer: MEDICARE

## 2017-07-20 PROCEDURE — 77385 HC IMRT TRMT DLVR SMPL: CPT

## 2017-07-21 ENCOUNTER — HOSPITAL ENCOUNTER (OUTPATIENT)
Dept: RADIATION THERAPY | Age: 72
Discharge: HOME | End: 2017-07-21
Payer: MEDICARE

## 2017-07-21 PROCEDURE — 77385 HC IMRT TRMT DLVR SMPL: CPT

## 2017-07-24 ENCOUNTER — HOSPITAL ENCOUNTER (OUTPATIENT)
Dept: RADIATION THERAPY | Age: 72
Discharge: HOME | End: 2017-07-24
Payer: MEDICARE

## 2017-07-24 PROCEDURE — 77385 HC IMRT TRMT DLVR SMPL: CPT

## 2017-07-25 ENCOUNTER — HOSPITAL ENCOUNTER (OUTPATIENT)
Dept: RADIATION THERAPY | Age: 72
Discharge: HOME | End: 2017-07-25
Payer: MEDICARE

## 2017-07-25 PROCEDURE — 77336 RADIATION PHYSICS CONSULT: CPT

## 2017-07-25 PROCEDURE — 77385 HC IMRT TRMT DLVR SMPL: CPT

## 2017-07-26 ENCOUNTER — HOSPITAL ENCOUNTER (OUTPATIENT)
Dept: RADIATION THERAPY | Age: 72
Discharge: HOME | End: 2017-07-26
Payer: MEDICARE

## 2017-07-26 PROCEDURE — 77385 HC IMRT TRMT DLVR SMPL: CPT

## 2017-07-27 ENCOUNTER — HOSPITAL ENCOUNTER (OUTPATIENT)
Dept: RADIATION THERAPY | Age: 72
Discharge: HOME | End: 2017-07-27
Payer: MEDICARE

## 2017-07-27 PROCEDURE — 77385 HC IMRT TRMT DLVR SMPL: CPT

## 2017-07-28 ENCOUNTER — HOSPITAL ENCOUNTER (OUTPATIENT)
Dept: RADIATION THERAPY | Age: 72
Discharge: HOME | End: 2017-07-28
Payer: MEDICARE

## 2017-07-28 PROCEDURE — 77385 HC IMRT TRMT DLVR SMPL: CPT

## 2017-07-31 ENCOUNTER — HOSPITAL ENCOUNTER (OUTPATIENT)
Dept: RADIATION THERAPY | Age: 72
Discharge: HOME | End: 2017-07-31
Payer: MEDICARE

## 2017-07-31 PROCEDURE — 77385 HC IMRT TRMT DLVR SMPL: CPT

## 2017-08-01 ENCOUNTER — HOSPITAL ENCOUNTER (OUTPATIENT)
Dept: RADIATION THERAPY | Age: 72
Discharge: HOME | End: 2017-08-01
Payer: MEDICARE

## 2017-08-01 PROCEDURE — 77385 HC IMRT TRMT DLVR SMPL: CPT

## 2017-08-01 PROCEDURE — 77336 RADIATION PHYSICS CONSULT: CPT

## 2017-09-20 ENCOUNTER — HOSPITAL ENCOUNTER (OUTPATIENT)
Dept: RADIATION THERAPY | Age: 72
Discharge: HOME | End: 2017-09-20
Payer: MEDICARE

## 2017-09-20 PROCEDURE — 99211 OFF/OP EST MAY X REQ PHY/QHP: CPT

## 2017-09-21 ENCOUNTER — DOCUMENTATION ONLY (OUTPATIENT)
Dept: ONCOLOGY | Age: 72
End: 2017-09-21

## 2017-09-21 NOTE — PROGRESS NOTES
Survivorship Care Plan given and reviewed 9/20/17. All questions answered.  Invited to Prostate Cancer Support Group

## 2018-03-13 DIAGNOSIS — C61 MALIGNANT NEOPLASM OF PROSTATE (HCC): ICD-10-CM

## 2018-03-14 ENCOUNTER — HOSPITAL ENCOUNTER (OUTPATIENT)
Dept: LAB | Age: 73
Discharge: HOME | End: 2018-03-14
Payer: MEDICARE

## 2018-03-14 PROCEDURE — 84154 ASSAY OF PSA FREE: CPT | Performed by: RADIOLOGY

## 2018-03-14 PROCEDURE — 36415 COLL VENOUS BLD VENIPUNCTURE: CPT | Performed by: RADIOLOGY

## 2018-03-14 PROCEDURE — 82652 VIT D 1 25-DIHYDROXY: CPT | Performed by: RADIOLOGY

## 2018-03-14 PROCEDURE — 84403 ASSAY OF TOTAL TESTOSTERONE: CPT | Performed by: RADIOLOGY

## 2018-03-15 LAB
1,25(OH)2D3 SERPL-MCNC: 36.4 PG/ML (ref 19.9–79.3)
PSA FREE MFR SERPL: NORMAL %
PSA FREE SERPL-MCNC: <0.01 NG/ML
PSA SERPL-MCNC: <0.1 NG/ML (ref 0–4)
TESTOST SERPL-MCNC: 232 NG/DL (ref 264–916)

## 2018-03-30 ENCOUNTER — HOSPITAL ENCOUNTER (OUTPATIENT)
Dept: RADIATION THERAPY | Age: 73
Discharge: HOME | End: 2018-03-30
Payer: MEDICARE

## 2018-03-30 PROCEDURE — 99211 OFF/OP EST MAY X REQ PHY/QHP: CPT

## 2018-04-19 PROBLEM — R39.15 URINARY URGENCY: Status: ACTIVE | Noted: 2018-04-19

## 2018-09-27 ENCOUNTER — HOSPITAL ENCOUNTER (OUTPATIENT)
Dept: LAB | Age: 73
Discharge: HOME | End: 2018-09-27
Payer: MEDICARE

## 2018-09-27 DIAGNOSIS — C61 MALIGNANT NEOPLASM OF PROSTATE (HCC): ICD-10-CM

## 2018-09-27 PROCEDURE — 84403 ASSAY OF TOTAL TESTOSTERONE: CPT | Performed by: RADIOLOGY

## 2018-09-27 PROCEDURE — 36415 COLL VENOUS BLD VENIPUNCTURE: CPT | Performed by: RADIOLOGY

## 2018-09-27 PROCEDURE — 84154 ASSAY OF PSA FREE: CPT | Performed by: RADIOLOGY

## 2018-09-28 LAB
PSA FREE MFR SERPL: NORMAL %
PSA FREE SERPL-MCNC: <0.01 NG/ML
PSA SERPL-MCNC: <0.1 NG/ML (ref 0–4)
TESTOST SERPL-MCNC: 374 NG/DL (ref 264–916)

## 2018-10-02 ENCOUNTER — HOSPITAL ENCOUNTER (OUTPATIENT)
Dept: RADIATION THERAPY | Age: 73
Discharge: HOME | End: 2018-10-02
Payer: MEDICARE

## 2018-10-02 PROCEDURE — 99211 OFF/OP EST MAY X REQ PHY/QHP: CPT

## 2019-04-01 ENCOUNTER — HOSPITAL ENCOUNTER (OUTPATIENT)
Dept: LAB | Age: 74
Discharge: HOME | End: 2019-04-01
Payer: MEDICARE

## 2019-04-01 DIAGNOSIS — C61 MALIGNANT NEOPLASM OF PROSTATE (HCC): ICD-10-CM

## 2019-04-01 LAB
25(OH)D3 SERPL-MCNC: 51.7 NG/ML (ref 30–100)
PSA SERPL-MCNC: 0.1 NG/ML (ref 0–4)

## 2019-04-01 PROCEDURE — 82306 VITAMIN D 25 HYDROXY: CPT

## 2019-04-01 PROCEDURE — 84403 ASSAY OF TOTAL TESTOSTERONE: CPT

## 2019-04-01 PROCEDURE — 84153 ASSAY OF PSA TOTAL: CPT

## 2019-04-01 PROCEDURE — 36415 COLL VENOUS BLD VENIPUNCTURE: CPT

## 2019-04-02 LAB — TESTOST SERPL-MCNC: 364 NG/DL (ref 264–916)

## 2019-04-05 ENCOUNTER — HOSPITAL ENCOUNTER (OUTPATIENT)
Dept: RADIATION THERAPY | Age: 74
Discharge: HOME | End: 2019-04-05
Payer: MEDICARE

## 2019-04-05 PROCEDURE — 99211 OFF/OP EST MAY X REQ PHY/QHP: CPT

## 2019-10-02 ENCOUNTER — HOSPITAL ENCOUNTER (OUTPATIENT)
Dept: LAB | Age: 74
Discharge: HOME | End: 2019-10-02
Payer: MEDICARE

## 2019-10-02 LAB — PSA SERPL-MCNC: 0 NG/ML (ref 0–4)

## 2019-10-02 PROCEDURE — 36415 COLL VENOUS BLD VENIPUNCTURE: CPT

## 2019-10-02 PROCEDURE — 84403 ASSAY OF TOTAL TESTOSTERONE: CPT

## 2019-10-02 PROCEDURE — 84153 ASSAY OF PSA TOTAL: CPT

## 2019-10-03 LAB — TESTOST SERPL-MCNC: 300 NG/DL (ref 264–916)

## 2019-10-15 ENCOUNTER — HOSPITAL ENCOUNTER (OUTPATIENT)
Dept: RADIATION THERAPY | Age: 74
Discharge: HOME | End: 2019-10-15
Payer: MEDICARE

## 2019-10-15 PROCEDURE — 99211 OFF/OP EST MAY X REQ PHY/QHP: CPT

## 2020-09-30 ENCOUNTER — TRANSCRIBE ORDER (OUTPATIENT)
Dept: RADIATION THERAPY | Age: 75
End: 2020-09-30

## 2020-09-30 DIAGNOSIS — E55.9 VITAMIN D DEFICIENCY: ICD-10-CM

## 2020-09-30 DIAGNOSIS — C61 MALIGNANT NEOPLASM OF PROSTATE (HCC): Primary | ICD-10-CM

## 2020-10-02 ENCOUNTER — HOSPITAL ENCOUNTER (OUTPATIENT)
Dept: LAB | Age: 75
Discharge: HOME | End: 2020-10-02
Payer: MEDICARE

## 2020-10-02 DIAGNOSIS — C61 MALIGNANT NEOPLASM OF PROSTATE (HCC): ICD-10-CM

## 2020-10-02 DIAGNOSIS — E55.9 VITAMIN D DEFICIENCY: ICD-10-CM

## 2020-10-02 LAB
25(OH)D3 SERPL-MCNC: 22.2 NG/ML (ref 30–100)
PSA SERPL-MCNC: 0 NG/ML (ref 0–4)

## 2020-10-02 PROCEDURE — 84153 ASSAY OF PSA TOTAL: CPT

## 2020-10-02 PROCEDURE — 84403 ASSAY OF TOTAL TESTOSTERONE: CPT

## 2020-10-02 PROCEDURE — 82306 VITAMIN D 25 HYDROXY: CPT

## 2020-10-03 LAB — TESTOST SERPL-MCNC: 269 NG/DL (ref 264–916)

## 2020-10-08 ENCOUNTER — HOSPITAL ENCOUNTER (OUTPATIENT)
Dept: RADIATION THERAPY | Age: 75
Discharge: HOME | End: 2020-10-08
Payer: MEDICARE

## 2020-10-08 PROCEDURE — 99211 OFF/OP EST MAY X REQ PHY/QHP: CPT

## 2021-03-02 ENCOUNTER — TRANSCRIBE ORDER (OUTPATIENT)
Dept: RADIATION THERAPY | Age: 76
End: 2021-03-02

## 2021-03-02 ENCOUNTER — HOSPITAL ENCOUNTER (OUTPATIENT)
Dept: LAB | Age: 76
Discharge: HOME | End: 2021-03-02
Payer: MEDICARE

## 2021-03-02 DIAGNOSIS — C61 MALIGNANT NEOPLASM OF PROSTATE (HCC): Primary | ICD-10-CM

## 2021-03-02 DIAGNOSIS — C61 MALIGNANT NEOPLASM OF PROSTATE (HCC): ICD-10-CM

## 2021-03-02 LAB — PSA SERPL-MCNC: 0 NG/ML (ref 0–4)

## 2021-03-02 PROCEDURE — 84153 ASSAY OF PSA TOTAL: CPT

## 2021-03-02 PROCEDURE — 36415 COLL VENOUS BLD VENIPUNCTURE: CPT

## 2021-03-02 PROCEDURE — 84403 ASSAY OF TOTAL TESTOSTERONE: CPT

## 2021-03-04 LAB
COMMENT, TESC2: NORMAL
TESTOST SERPL-MCNC: 315 NG/DL (ref 264–916)

## 2021-03-05 ENCOUNTER — HOSPITAL ENCOUNTER (OUTPATIENT)
Dept: RADIATION THERAPY | Age: 76
Discharge: HOME | End: 2021-03-05
Payer: MEDICARE

## 2021-03-05 PROCEDURE — 99211 OFF/OP EST MAY X REQ PHY/QHP: CPT

## 2021-07-22 PROBLEM — J44.9 CHRONIC OBSTRUCTIVE PULMONARY DISEASE (HCC): Status: ACTIVE | Noted: 2021-07-22

## 2021-08-27 ENCOUNTER — TRANSCRIBE ORDER (OUTPATIENT)
Dept: RADIATION THERAPY | Age: 76
End: 2021-08-27

## 2021-08-27 DIAGNOSIS — C61 MALIGNANT NEOPLASM OF PROSTATE (HCC): Primary | ICD-10-CM

## 2021-09-08 ENCOUNTER — HOSPITAL ENCOUNTER (OUTPATIENT)
Dept: LAB | Age: 76
Discharge: HOME | End: 2021-09-08
Payer: MEDICARE

## 2021-09-08 DIAGNOSIS — C61 MALIGNANT NEOPLASM OF PROSTATE (HCC): ICD-10-CM

## 2021-09-08 LAB — PSA SERPL-MCNC: 0 NG/ML (ref 0–4)

## 2021-09-08 PROCEDURE — 84153 ASSAY OF PSA TOTAL: CPT

## 2021-09-08 PROCEDURE — 36415 COLL VENOUS BLD VENIPUNCTURE: CPT

## 2021-09-08 PROCEDURE — 84403 ASSAY OF TOTAL TESTOSTERONE: CPT

## 2021-09-09 LAB — TESTOST SERPL-MCNC: 345 NG/DL (ref 264–916)

## 2021-09-14 ENCOUNTER — HOSPITAL ENCOUNTER (OUTPATIENT)
Dept: RADIATION THERAPY | Age: 76
Discharge: HOME | End: 2021-09-14
Payer: MEDICARE

## 2021-09-14 PROCEDURE — 99211 OFF/OP EST MAY X REQ PHY/QHP: CPT

## 2022-02-14 ENCOUNTER — TRANSCRIBE ORDER (OUTPATIENT)
Dept: RADIATION THERAPY | Age: 77
End: 2022-02-14

## 2022-02-14 DIAGNOSIS — C61 MALIGNANT NEOPLASM OF PROSTATE (HCC): Primary | ICD-10-CM

## 2022-03-16 ENCOUNTER — HOSPITAL ENCOUNTER (OUTPATIENT)
Dept: LAB | Age: 77
Discharge: HOME | End: 2022-03-16
Payer: MEDICARE

## 2022-03-16 DIAGNOSIS — C61 MALIGNANT NEOPLASM OF PROSTATE (HCC): ICD-10-CM

## 2022-03-16 LAB — PSA SERPL-MCNC: 0 NG/ML (ref 0–4)

## 2022-03-16 PROCEDURE — 84153 ASSAY OF PSA TOTAL: CPT

## 2022-03-16 PROCEDURE — 36415 COLL VENOUS BLD VENIPUNCTURE: CPT

## 2022-03-16 PROCEDURE — 84403 ASSAY OF TOTAL TESTOSTERONE: CPT

## 2022-03-18 LAB — TESTOST SERPL-MCNC: 243 NG/DL (ref 264–916)

## 2022-03-22 ENCOUNTER — HOSPITAL ENCOUNTER (OUTPATIENT)
Dept: RADIATION THERAPY | Age: 77
Discharge: HOME | End: 2022-03-22
Payer: MEDICARE

## 2022-03-22 PROCEDURE — 99211 OFF/OP EST MAY X REQ PHY/QHP: CPT

## 2022-03-22 PROCEDURE — 99213 OFFICE O/P EST LOW 20 MIN: CPT | Performed by: RADIOLOGY

## 2022-10-17 ENCOUNTER — HOSPITAL ENCOUNTER (OUTPATIENT)
Dept: LAB | Age: 77
Discharge: HOME | End: 2022-10-17
Payer: MEDICARE

## 2022-10-17 ENCOUNTER — TRANSCRIBE ORDER (OUTPATIENT)
Dept: RADIATION THERAPY | Age: 77
End: 2022-10-17

## 2022-10-17 DIAGNOSIS — C61 MALIGNANT NEOPLASM OF PROSTATE (HCC): Primary | ICD-10-CM

## 2022-10-17 DIAGNOSIS — C61 MALIGNANT NEOPLASM OF PROSTATE (HCC): ICD-10-CM

## 2022-10-17 LAB — PSA SERPL-MCNC: 0 NG/ML (ref 0–4)

## 2022-10-17 PROCEDURE — 84153 ASSAY OF PSA TOTAL: CPT

## 2022-10-17 PROCEDURE — 84403 ASSAY OF TOTAL TESTOSTERONE: CPT

## 2022-10-17 PROCEDURE — 36415 COLL VENOUS BLD VENIPUNCTURE: CPT

## 2022-10-18 LAB — TESTOST SERPL-MCNC: 241 NG/DL (ref 264–916)
